# Patient Record
Sex: FEMALE | URBAN - METROPOLITAN AREA
[De-identification: names, ages, dates, MRNs, and addresses within clinical notes are randomized per-mention and may not be internally consistent; named-entity substitution may affect disease eponyms.]

---

## 2021-09-27 ENCOUNTER — APPOINTMENT (RX ONLY)
Dept: URBAN - METROPOLITAN AREA CLINIC 135 | Facility: CLINIC | Age: 46
Setting detail: DERMATOLOGY
End: 2021-09-27

## 2021-09-27 DIAGNOSIS — B07.0 PLANTAR WART: ICD-10-CM

## 2021-09-27 PROCEDURE — ? LIQUID NITROGEN

## 2021-09-27 ASSESSMENT — LOCATION SIMPLE DESCRIPTION DERM: LOCATION SIMPLE: LEFT PLANTAR SURFACE

## 2021-09-27 ASSESSMENT — LOCATION ZONE DERM: LOCATION ZONE: FEET

## 2021-09-27 ASSESSMENT — LOCATION DETAILED DESCRIPTION DERM: LOCATION DETAILED: LEFT PLANTAR FOREFOOT OVERLYING 5TH METATARSAL

## 2021-09-27 NOTE — PROCEDURE: LIQUID NITROGEN
Medical Necessity Clause: This procedure was medically necessary because the lesions that were treated were:
Medical Necessity Information: It is in your best interest to select a reason for this procedure from the list below. All of these items fulfill various CMS LCD requirements except the new and changing color options.
Detail Level: Detailed
Show Topical Anesthesia Variable?: Yes
Add 52 Modifier (Optional): no
Post-Care Instructions: I reviewed with the patient in detail post-care instructions. Patient is to wear sunprotection, and avoid picking at any of the treated lesions. Pt may apply Vaseline to crusted or scabbing areas.
Number Of Freeze-Thaw Cycles: 3 freeze-thaw cycles
Consent: The patient's consent was obtained including but not limited to risks of crusting, scabbing, blistering, scarring, darker or lighter pigmentary change, recurrence, incomplete removal and infection.
Pared With?: 15 blade

## 2022-04-08 ENCOUNTER — APPOINTMENT (RX ONLY)
Dept: URBAN - METROPOLITAN AREA CLINIC 135 | Facility: CLINIC | Age: 47
Setting detail: DERMATOLOGY
End: 2022-04-08

## 2022-04-08 DIAGNOSIS — L71.8 OTHER ROSACEA: ICD-10-CM

## 2022-04-08 PROCEDURE — ? COUNSELING

## 2022-04-08 PROCEDURE — 99213 OFFICE O/P EST LOW 20 MIN: CPT

## 2022-04-08 PROCEDURE — ? TREATMENT REGIMEN

## 2022-04-08 PROCEDURE — ? PRESCRIPTION

## 2022-04-08 RX ORDER — IVERMECTIN 10 MG/G
CREAM TOPICAL
Qty: 45 | Refills: 3 | Status: ERX | COMMUNITY
Start: 2022-04-08

## 2022-04-08 RX ORDER — DOXYCYCLINE 40 MG/1
CAPSULE ORAL QD
Qty: 32 | Refills: 3 | Status: CANCELLED | COMMUNITY
Start: 2022-04-08

## 2022-04-08 RX ADMIN — DOXYCYCLINE: 40 CAPSULE ORAL at 00:00

## 2022-04-08 RX ADMIN — IVERMECTIN: 10 CREAM TOPICAL at 00:00

## 2022-04-08 NOTE — HPI: RASH
What Type Of Note Output Would You Prefer (Optional)?: Standard Output
How Severe Is Your Rash?: severe
Is This A New Presentation, Or A Follow-Up?: Rash
Additional History: Pt. States that she has not tried anything prescription yet.

## 2022-04-13 RX ORDER — DOXYCYCLINE 40 MG/1
CAPSULE ORAL
Qty: 60 | Refills: 3 | Status: ERX

## 2022-04-26 ENCOUNTER — RX ONLY (OUTPATIENT)
Age: 47
Setting detail: RX ONLY
End: 2022-04-26

## 2022-04-26 RX ORDER — DOXYCYCLINE 40 MG/1
CAPSULE ORAL
Qty: 60 | Refills: 3 | Status: ERX

## 2022-04-26 RX ORDER — IVERMECTIN 10 MG/G
CREAM TOPICAL
Qty: 45 | Refills: 3 | Status: ERX

## 2022-05-10 ENCOUNTER — RX ONLY (OUTPATIENT)
Age: 47
Setting detail: RX ONLY
End: 2022-05-10

## 2022-05-10 RX ORDER — IVERMECTIN 10 MG/G
CREAM TOPICAL
Qty: 45 | Refills: 3 | Status: CANCELLED

## 2022-05-10 RX ORDER — DOXYCYCLINE 40 MG/1
CAPSULE ORAL
Qty: 60 | Refills: 3 | Status: ERX

## 2022-05-17 ENCOUNTER — APPOINTMENT (RX ONLY)
Dept: URBAN - METROPOLITAN AREA CLINIC 135 | Facility: CLINIC | Age: 47
Setting detail: DERMATOLOGY
End: 2022-05-17

## 2022-05-17 DIAGNOSIS — L71.8 OTHER ROSACEA: ICD-10-CM

## 2022-05-17 PROCEDURE — ? PRESCRIPTION

## 2022-05-17 RX ORDER — METRONIDAZOLE 7.5 MG/G
CREAM TOPICAL
Qty: 45 | Refills: 2 | Status: ERX | COMMUNITY
Start: 2022-05-17

## 2022-05-17 RX ADMIN — METRONIDAZOLE: 7.5 CREAM TOPICAL at 00:00
